# Patient Record
Sex: MALE | Race: WHITE | Employment: FULL TIME | URBAN - METROPOLITAN AREA
[De-identification: names, ages, dates, MRNs, and addresses within clinical notes are randomized per-mention and may not be internally consistent; named-entity substitution may affect disease eponyms.]

---

## 2017-12-04 ENCOUNTER — OFFICE VISIT (OUTPATIENT)
Dept: FAMILY MEDICINE CLINIC | Age: 32
End: 2017-12-04

## 2017-12-04 VITALS
SYSTOLIC BLOOD PRESSURE: 110 MMHG | OXYGEN SATURATION: 100 % | HEIGHT: 70 IN | WEIGHT: 186.8 LBS | TEMPERATURE: 98.2 F | BODY MASS INDEX: 26.74 KG/M2 | HEART RATE: 111 BPM | DIASTOLIC BLOOD PRESSURE: 80 MMHG

## 2017-12-04 DIAGNOSIS — Z23 NEED FOR TDAP VACCINATION: ICD-10-CM

## 2017-12-04 DIAGNOSIS — Z11.4 SCREENING FOR HIV (HUMAN IMMUNODEFICIENCY VIRUS): ICD-10-CM

## 2017-12-04 DIAGNOSIS — R31.21 ASYMPTOMATIC MICROSCOPIC HEMATURIA: ICD-10-CM

## 2017-12-04 DIAGNOSIS — H61.21 IMPACTED CERUMEN OF RIGHT EAR: ICD-10-CM

## 2017-12-04 DIAGNOSIS — F41.1 GENERALIZED ANXIETY DISORDER: ICD-10-CM

## 2017-12-04 DIAGNOSIS — Z00.00 ROUTINE GENERAL MEDICAL EXAMINATION AT A HEALTH CARE FACILITY: Primary | ICD-10-CM

## 2017-12-04 DIAGNOSIS — R82.90 ABNORMAL URINE FINDING: ICD-10-CM

## 2017-12-04 DIAGNOSIS — Z00.00 ROUTINE GENERAL MEDICAL EXAMINATION AT A HEALTH CARE FACILITY: ICD-10-CM

## 2017-12-04 LAB
A/G RATIO: 1.8 (ref 1.1–2.2)
ALBUMIN SERPL-MCNC: 4.9 G/DL (ref 3.4–5)
ALP BLD-CCNC: 89 U/L (ref 40–129)
ALT SERPL-CCNC: 45 U/L (ref 10–40)
ANION GAP SERPL CALCULATED.3IONS-SCNC: 16 MMOL/L (ref 3–16)
AST SERPL-CCNC: 21 U/L (ref 15–37)
BASOPHILS ABSOLUTE: 0 K/UL (ref 0–0.2)
BASOPHILS RELATIVE PERCENT: 0.4 %
BILIRUB SERPL-MCNC: 0.6 MG/DL (ref 0–1)
BILIRUBIN, POC: NORMAL
BLOOD URINE, POC: NORMAL
BUN BLDV-MCNC: 16 MG/DL (ref 7–20)
CALCIUM SERPL-MCNC: 9.9 MG/DL (ref 8.3–10.6)
CHLORIDE BLD-SCNC: 103 MMOL/L (ref 99–110)
CHOLESTEROL, TOTAL: 218 MG/DL (ref 0–199)
CLARITY, POC: CLEAR
CO2: 24 MMOL/L (ref 21–32)
COLOR, POC: YELLOW
CREAT SERPL-MCNC: 1 MG/DL (ref 0.9–1.3)
EOSINOPHILS ABSOLUTE: 0.2 K/UL (ref 0–0.6)
EOSINOPHILS RELATIVE PERCENT: 2.7 %
GFR AFRICAN AMERICAN: >60
GFR NON-AFRICAN AMERICAN: >60
GLOBULIN: 2.8 G/DL
GLUCOSE BLD-MCNC: 94 MG/DL (ref 70–99)
GLUCOSE URINE, POC: NORMAL
HCT VFR BLD CALC: 51.3 % (ref 40.5–52.5)
HDLC SERPL-MCNC: 48 MG/DL (ref 40–60)
HEMOGLOBIN: 17.6 G/DL (ref 13.5–17.5)
KETONES, POC: NORMAL
LDL CHOLESTEROL CALCULATED: 141 MG/DL
LEUKOCYTE EST, POC: NORMAL
LYMPHOCYTES ABSOLUTE: 1.5 K/UL (ref 1–5.1)
LYMPHOCYTES RELATIVE PERCENT: 21.4 %
MCH RBC QN AUTO: 30.9 PG (ref 26–34)
MCHC RBC AUTO-ENTMCNC: 34.2 G/DL (ref 31–36)
MCV RBC AUTO: 90.1 FL (ref 80–100)
MONOCYTES ABSOLUTE: 0.5 K/UL (ref 0–1.3)
MONOCYTES RELATIVE PERCENT: 6.4 %
NEUTROPHILS ABSOLUTE: 5 K/UL (ref 1.7–7.7)
NEUTROPHILS RELATIVE PERCENT: 69.1 %
NITRITE, POC: NORMAL
PDW BLD-RTO: 13.2 % (ref 12.4–15.4)
PH, POC: 5
PLATELET # BLD: 268 K/UL (ref 135–450)
PMV BLD AUTO: 9.1 FL (ref 5–10.5)
POTASSIUM SERPL-SCNC: 4.9 MMOL/L (ref 3.5–5.1)
PROTEIN, POC: NORMAL
RBC # BLD: 5.69 M/UL (ref 4.2–5.9)
SODIUM BLD-SCNC: 143 MMOL/L (ref 136–145)
SPECIFIC GRAVITY, POC: 1.03
TOTAL PROTEIN: 7.7 G/DL (ref 6.4–8.2)
TRIGL SERPL-MCNC: 145 MG/DL (ref 0–150)
TSH SERPL DL<=0.05 MIU/L-ACNC: 6.32 UIU/ML (ref 0.27–4.2)
UROBILINOGEN, POC: 0.2
VITAMIN D 25-HYDROXY: 15.9 NG/ML
VLDLC SERPL CALC-MCNC: 29 MG/DL
WBC # BLD: 7.2 K/UL (ref 4–11)

## 2017-12-04 PROCEDURE — 90715 TDAP VACCINE 7 YRS/> IM: CPT | Performed by: FAMILY MEDICINE

## 2017-12-04 PROCEDURE — 90471 IMMUNIZATION ADMIN: CPT | Performed by: FAMILY MEDICINE

## 2017-12-04 PROCEDURE — 81002 URINALYSIS NONAUTO W/O SCOPE: CPT | Performed by: FAMILY MEDICINE

## 2017-12-04 PROCEDURE — 99395 PREV VISIT EST AGE 18-39: CPT | Performed by: FAMILY MEDICINE

## 2017-12-04 RX ORDER — PAROXETINE 10 MG/1
10 TABLET, FILM COATED ORAL DAILY
Qty: 90 TABLET | Refills: 1 | Status: SHIPPED | OUTPATIENT
Start: 2017-12-04 | End: 2018-03-05 | Stop reason: SDUPTHER

## 2017-12-04 ASSESSMENT — ENCOUNTER SYMPTOMS
EYES NEGATIVE: 1
RESPIRATORY NEGATIVE: 1
GASTROINTESTINAL NEGATIVE: 1
ALLERGIC/IMMUNOLOGIC NEGATIVE: 1

## 2017-12-04 ASSESSMENT — PATIENT HEALTH QUESTIONNAIRE - PHQ9
SUM OF ALL RESPONSES TO PHQ QUESTIONS 1-9: 2
1. LITTLE INTEREST OR PLEASURE IN DOING THINGS: 1
2. FEELING DOWN, DEPRESSED OR HOPELESS: 1
SUM OF ALL RESPONSES TO PHQ9 QUESTIONS 1 & 2: 2

## 2017-12-04 NOTE — LETTER
[address]  Phone: [phone]  Fax: [phone]    Miles Rios MD        December 4, 2017    Cindy Hameed  [address] [place] [address].  [place] [phone]      To whom it may concern: The patient was seen by me today for a routine annual physical.     If you have any questions or concerns, please don't hesitate to call.     Sincerely,        Miles Rios MD

## 2017-12-04 NOTE — LETTER
[address] [address]  Phone: [phone]  Fax: [phone]    Miles Rios MD                                                                                   December 4, 2017                       Cindy Hameed  [address] [place] [address].  [place] [phone]      Dear Yue Godinez: Thank you for enrolling in [address] Please follow the instructions below to securely access your online medical record. [facility] allows you to send messages to your doctor, view your test results, renew your prescriptions, schedule appointments, and more. How Do I Sign Up? 1. In your Internet browser, go to [email or web address]  2. Click on the Sign Up Now link in the Sign In box. You will see the New Member Sign Up page. 3. Enter your [facility] Access Code exactly as it appears below. You will not need to use this code after youve completed the sign-up process. If you do not sign up before the expiration date, you must request a new code. [facility] Access Code: [email or web address]  Expires: 2/2/2018  7:48 AM    4. Enter your Social Security Number ([id number]) and Date of Birth (mm/dd/yyyy) as indicated and click Submit. You will be taken to the next sign-up page. 5. Create a [facility] ID. This will be your [facility] login ID and cannot be changed, so think of one that is secure and easy to remember. 6. Create a [facility] password. You can change your password at any time. 7. Enter your Password Reset Question and Answer. This can be used at a later time if you forget your password. 8. Enter your e-mail address. You will receive e-mail notification when new information is available in [address] 9. Click Sign Up. You can now view your medical record. Additional Information  If you have questions, please contact the physician practice where you receive care. Remember, [facility] is NOT to be used for urgent needs. For medical emergencies, dial 911. For questions regarding your [facility] account call [phone]. If you have a clinical question, please call your doctor's office.     Sincerely,  Nicki Chávez MD

## 2017-12-05 ENCOUNTER — TELEPHONE (OUTPATIENT)
Dept: PSYCHOLOGY | Age: 32
End: 2017-12-05

## 2017-12-05 LAB — HIV-1 AND HIV-2 ANTIBODIES: NORMAL

## 2017-12-06 DIAGNOSIS — R79.89 ABNORMAL THYROID BLOOD TEST: ICD-10-CM

## 2017-12-06 DIAGNOSIS — E55.9 VITAMIN D DEFICIENCY: Primary | ICD-10-CM

## 2017-12-06 LAB — URINE CULTURE, ROUTINE: NORMAL

## 2017-12-06 RX ORDER — ERGOCALCIFEROL (VITAMIN D2) 1250 MCG
50000 CAPSULE ORAL WEEKLY
Qty: 16 CAPSULE | Refills: 0 | Status: SHIPPED | OUTPATIENT
Start: 2017-12-06 | End: 2021-03-02 | Stop reason: CLARIF

## 2017-12-15 ENCOUNTER — OFFICE VISIT (OUTPATIENT)
Dept: PSYCHOLOGY | Age: 32
End: 2017-12-15

## 2017-12-15 DIAGNOSIS — F32.A DEPRESSIVE DISORDER: ICD-10-CM

## 2017-12-15 DIAGNOSIS — F40.10 SOCIAL ANXIETY DISORDER: Primary | ICD-10-CM

## 2017-12-15 PROCEDURE — 90791 PSYCH DIAGNOSTIC EVALUATION: CPT | Performed by: PSYCHOLOGIST

## 2017-12-15 ASSESSMENT — PATIENT HEALTH QUESTIONNAIRE - PHQ9
9. THOUGHTS THAT YOU WOULD BE BETTER OFF DEAD, OR OF HURTING YOURSELF: 0
SUM OF ALL RESPONSES TO PHQ QUESTIONS 1-9: 3
2. FEELING DOWN, DEPRESSED OR HOPELESS: 0
1. LITTLE INTEREST OR PLEASURE IN DOING THINGS: 2
SUM OF ALL RESPONSES TO PHQ9 QUESTIONS 1 & 2: 2
5. POOR APPETITE OR OVEREATING: 0
6. FEELING BAD ABOUT YOURSELF - OR THAT YOU ARE A FAILURE OR HAVE LET YOURSELF OR YOUR FAMILY DOWN: 0
7. TROUBLE CONCENTRATING ON THINGS, SUCH AS READING THE NEWSPAPER OR WATCHING TELEVISION: 0
4. FEELING TIRED OR HAVING LITTLE ENERGY: 1
8. MOVING OR SPEAKING SO SLOWLY THAT OTHER PEOPLE COULD HAVE NOTICED. OR THE OPPOSITE, BEING SO FIGETY OR RESTLESS THAT YOU HAVE BEEN MOVING AROUND A LOT MORE THAN USUAL: 0
3. TROUBLE FALLING OR STAYING ASLEEP: 0

## 2017-12-15 ASSESSMENT — ANXIETY QUESTIONNAIRES
1. FEELING NERVOUS, ANXIOUS, OR ON EDGE: 1-SEVERAL DAYS
5. BEING SO RESTLESS THAT IT IS HARD TO SIT STILL: 0-NOT AT ALL SURE
7. FEELING AFRAID AS IF SOMETHING AWFUL MIGHT HAPPEN: 0-NOT AT ALL SURE
2. NOT BEING ABLE TO STOP OR CONTROL WORRYING: 0-NOT AT ALL SURE
GAD7 TOTAL SCORE: 2
4. TROUBLE RELAXING: 0-NOT AT ALL SURE
6. BECOMING EASILY ANNOYED OR IRRITABLE: 1-SEVERAL DAYS
3. WORRYING TOO MUCH ABOUT DIFFERENT THINGS: 0-NOT AT ALL SURE

## 2017-12-15 NOTE — PROGRESS NOTES
no  Suicide Assessment: no suicidal ideation      History:    Medications:   Current Outpatient Prescriptions   Medication Sig Dispense Refill    ergocalciferol (ERGOCALCIFEROL) 57546 units capsule Take 1 capsule by mouth once a week Weekly for four months 16 capsule 0    PARoxetine (PAXIL) 10 MG tablet Take 1 tablet by mouth daily 90 tablet 1    dicyclomine (BENTYL) 10 MG capsule Take 1 capsule by mouth 4 times daily for 14 days 56 capsule 0     No current facility-administered medications for this visit. Social History:   Social History     Social History    Marital status: Single     Spouse name: N/A    Number of children: N/A    Years of education: N/A     Occupational History    Not on file. Social History Main Topics    Smoking status: Current Some Day Smoker     Types: Cigarettes     Last attempt to quit: 7/24/2015    Smokeless tobacco: Never Used      Comment: smokes when he ddrinks    Alcohol use 0.0 oz/week      Comment: social (once a month)    Drug use: No    Sexual activity: Not on file     Other Topics Concern    Not on file     Social History Narrative    No narrative on file       TOBACCO:   reports that he has been smoking Cigarettes. He has never used smokeless tobacco.  ETOH:   reports that he drinks alcohol. Family History:   Family History   Problem Relation Age of Onset    Heart Attack Maternal Grandfather        A:  Pt reported a history of anxiety related to social situations that has caused him to become more avoidant and less active over the past few years. Functions well at work but has increasingly been limiting his social interactions to minimize anxiety that causes somatic symptoms (e.g., GI upset). Some social support from his mother and close friend. Denied feeling depressed but did endorse worsening anhedonia. Pt started taking Paxil 10mg last week, prescribed by Dr. Kailey David. Normalized and validated pt's distress.  Provided psychoeducation about anxiety, the role of avoidance in maintaining anxiety, depression, CBT, and mindfulness. Practiced diaphragmatic breathing, progressive muscle relaxation, and grounding during visit. Encouraged pt to engage more socially. Pt denied SI/HI, plan or intent. No safety concerns at this time. Pt is aware of this writer's upcoming maternity leave. He declined a referral for psychotherapy in the community, stating he would prefer to return to see this writer upon my return in spring 2018. Scheduled F/U visit for that time. ALY 7 SCORE 12/15/2017   ALY-7 Total Score 2     Interpretation of ALY-7 score: 5-9 = mild anxiety, 10-14 = moderate anxiety, 15+ = severe anxiety. Recommend referral to behavioral health for scores 10 or greater. PHQ Scores 12/15/2017 12/4/2017   PHQ2 Score 2 2   PHQ9 Score 3 2     Interpretation of Total Score Depression Severity: 1-4 = Minimal depression, 5-9 = Mild depression, 10-14 = Moderate depression, 15-19 = Moderately severe depression, 20-27 = Severe depression    Diagnosis:    1. Social anxiety disorder    2. Depressive disorder        Patient Active Problem List   Diagnosis    Irritable bowel syndrome with diarrhea    Anxiety disorder    Vitamin D deficiency    Abnormal thyroid blood test         Plan:  Pt interventions:  Established rapport, [place]-setting to identify pt's primary goals for [facility] visit / overall health, Supportive techniques, Emphasized self-care as important for managing overall health, Provided Psychoeducation re: anxiety, the role of avoidance in maintaining anxiety, depression, CBT, mindfulness, CBT to target maladaptive thoughts, Identified relevant behavioral strategies for targeting symptom reduction including increased socialization, mindfulness strategies, Explained relaxed breathing technique in detail and practiced this with pt in visit and Emphasized importance of regular practice of relaxation strategies to target / promote stress mgmt.  Provided handouts on

## 2017-12-15 NOTE — PATIENT INSTRUCTIONS
Goals: 1. Start saying yes to invitations to do things with friends and family  2. Practice being mindful - paying attention to the present moment in a nonjudgmental way  3. Practice diaphragmatic breathing throughout the day  4. Practice progressive muscle relaxation for 5 minutes daily  5. Practice grounding exercises - noticing what your 5 senses are experiencing in the moment        Depression: Facts, Myths & Tips for Feeling Better    Facts  Depression is very common  Nearly 20% of the [place] population experiences a significant depression during their lifetime. Depression is treatable  Because depression affects so many, and can have such a powerful and negative impact on life, there has been an enormous amount of research conducted on how to reduce symptoms and improve functioning. As a result, we now know there are behaviors YOU can engage in to make yourself feel better. Depression is not a weakness  People suffer from depression for a variety of reasons, biological, environmental and behavioral.  Research indicates that [employer] is NOT one of the reasons people become depressed. Depression is not something you are powerless against  Evidence suggests that you can directly impact the intensity and duration of depression by what you do and by altering the way you think about certain things. The Downward Spiral  Depression often begins as a drop in mood due to an environmental or biological trigger that makes people feel less like being active. Being less active, in turn, often causes people to experience an even lower mood and feel even less like being active, and so the cycle begins. 1. Low mood begins and you feel like doing less. 2. You stop doing the things you enjoy and become less active. 3. Your low mood becomes even lower and you feel like doing even less.   4. Cycle continues until you begin to completely isolate and cease all pleasurable activities OR you reverse the this?  7. Am I using extreme, black-and-white language? What less extreme words might be more accurate? 8. Am I fortune telling or mind reading in a way that gets me upset or unhappy? What are the odds (percent chance -- e.g., there is a 5% chance. ..) that it will really turn out the way Im thinking or imagining? 9. What are my options in this situation? How would I like to respond? 10. Create more moderate, helpful, or realistic statements to replace the upsetting ones. 11. Have I had any experiences that show that this thought might not be totally true? 12. If my best friend or someone I loved had this thought, what would I tell them? 15. If my best friend or someone I loved knew I was thinking this thought, what would they say to me? What evidence would they point out to me that would suggest that my thought is not completely true? 14. Are there strengths in me or positives in the situation that I am ignoring? Am I underestimating my ability to cope with unfortunate circumstances? 15. When I am not feeling this way, do I think about this situation any differently? How?  16. Have I been in this type of situation before? What happened? What have I learned from prior experiences that could help me now? 17. Five years from now, if I look back on this situation, will I look at it any differently? Will I focus on any different part of my experience? 25. Am I blaming myself for something over which I do not have complete control?         Depression Cycle         Thoughts        -There is no point in doing anything        -I dont have the energy        -I dont feel like it        -I will probably fail        -I need to rest more        -Ill do it later               Depressive Symptoms     Behaviors  -Tired/Overwhelmed      -Stay in Bed  -Bored        -Avoid People  -Depressed       -Avoid Fun Activities  -Feeling Worthless      -Avoid Work  -Apathetic/Discouraged     -Guilty        Consequences of communication   [place] negative thinking about oneself or situations   [place] focusing on problems rather than solutions   Lack of meaning/purpose in life    It is important to note that there is rarely one single cause of depression. It is probable that if you are depressed, many of the causes described above are playing some role to some degree. Therefore, the more coping strategies you adopt over time to address the various causes of depression described above, the more successful you will be in reducing your depression. MINDFULNESS    Mindfulness means paying attention in a particular way: on purpose, in the present moment, and non-judgmentally. Sebastian Sat    \"Mindfulness is the basic human ability to be fully present, aware of where we are and what were doing, and not overly reactive or overwhelmed by whats going on around us. \"   -Mindful [place]    Paying attention on purpose  Mindfulness involves paying attention on purpose. Mindfulness involves a conscious direction of our awareness. This can mean purposefully directing our attention to the breath, or a particular emotion, or an activity as simple as eating. Doing so allows us to actively shape the mind. Paying attention in the present moment  Left to itself, the mind wanders through all kinds of thoughts  including thoughts expressing anger, craving, depression, self-pity, and anxiety. As we indulge in these kinds of thoughts, we reinforce those emotions and cause ourselves to suffer. These thoughts usually center around the past or future. But the past no longer exists. The future is just a fantasy until it happens. The one moment we actually can experience  the present moment  is the one we seem most to avoid.  By purposefully directing our awareness away from thoughts about the past or future and instead towards the [address] - our present moment experience - we decrease the effect of these thoughts on our lives. Paying attention non-judgmentally  Mindfulness is an emotionally non-reactive state. We don't  that this experience is good and that one is bad. Or, if we do make those judgments, we dont get upset in reaction to our experience. We simply notice it arising, observe it mindfully, and allow it to pass through us. When practicing mindfulness, we may be aware that certain experiences are pleasant and some are unpleasant, but on an emotional level we dont react. Resources  · \"Wherever You Go, There You Are: Mindfulness Meditation in Everyday Life\" - by Sagrario Arzola  · \"The Miracle of Mindfulness: An Introduction to the Practice of Meditation\" - by Cici Portillo  · \"The Power of Now: A Guide to Spiritual Enlightenment\" - by Charlie Herbert  · \"The Mindfulness Solution: Everyday Practices for Everyday Problems\" - by uJan Mckeon    Ways to Practice Mindfulness  · Pay attention to your breathing  · Take a mindful walk  · Eat mindfully  · Ground yourself in your five senses  · Journal  · Try dishwashing, cleaning and doing laundry a little bit slower than you usually do  · Meditate        Diaphragmatic Breathing    \"The entire autonomic nervous system (and through it, our internal organs and glands) is largely driven by our breathing patterns. By changing our breathing we can influence millions of biochemical reactions in our body, producing more relaxing substances such as endorphins and fewer anxiety-producing ones like adrenaline and higher blood acidity. Mindfulness of the breath is so effective that it is common to all meditative and prayer traditions. \" Anxiety Fear & Breathing - Breathing. com    \"When overcoming high levels of anxiety, it is important to learn the techniques of correct breathing.  Many people who live with high levels of anxiety are known to breathe through their chest. Shallow breathing through the chest means you are disrupting the balance of oxygen and carbon dioxide increasing arousal during stressful situations. What Is the Best Way To Use Diaphragmatic Breathing Exercises?  Use diaphragmatic breathing frequently.  Take deep breaths at the first signs of stress, anxiety, physical tension, or other symptoms.  Schedule time for relaxation. How to Do It  Diaphragmatic breathing involves taking smooth, slow, and regular breaths. Sitting upright can increase the capacity of your lungs to fill with air. It is best to 'take the weight' off your shoulders by supporting your arms on the side-arms of a chair, or on your lap. You may also choose to practice breathing while lying down as well. 1. Take a slow breath in through the nose, breathing into your lower belly (for about 4 seconds)   2. Exhale slowly through the mouth (for about 7-8 seconds)     About 6-8 breathing cycles per minute is often helpful to decrease anxiety, but find your  own comfortable breathing rhythm. These cycles regulate the amount of oxygen you  take in so that you do not experience the fainting, tingling, and giddy sensations that are  sometimes associated with overbreathing. Helpful Hints  Make sure that you arent hyperventitating; it is important to pause for a second or two after each breath. Try to breathe from your diaphragm or abdomen. Your shoulders and chest area  should be fairly relaxed and still. If this is challenging at first, it can be helpful to  first try this exercise by lying down on the floor with one hand on your heart, the  other hand on your abdomen. Watch the hand on your abdomen rise as you fill  your lungs with air, expanding your chest.     Rules of Practice   Try diaphragmatic breathing for one to two minutes throughout the day. You do not need to be feeling anxious to practice  in fact, at first you  should practice while feeling relatively calm.  You need to be comfortable  breathing this way when feeling calm before you can feel comfortable doing it  when anxious. [place] gradually master this skill and feel the benefits! Once you are comfortable with this technique, you will feel more comfortable using it in situations that cause anxiety. Progressive Muscle Relaxation (8 Muscle Groups)    For each muscle group, tense the muscles involved about 1/3 to 2/3 of the maximum tension possible (enough to feel tension but not any pain). Hold in the tensed position for about 4 seconds, then let the muscles relax in their natural resting positions for about 40 seconds. Do not tense them again. Allow them to relax as you move on to the next muscle group. If you prefer not to add more tension to your body, simply focus on these muscle groups, one at a time. Notice any tension, tightness or soreness in that area. Imagine sending your breath into those muscle to relax them, letting the tension go as you exhale. If a certain area of your body does not respond with relaxation, simply notice this and move on. You can choose to do a body scan by working your way up or down your body in whatever order you choose. You can also use the following guidelines:    1. Both Arms:  Turn your palms up, then make a fist.  Bring your fists up to your shoulders while tensing the biceps. 2. Both Legs:  Lift both legs off of the ground, straighten your knees, and point your toes toward your head. 3. Abdomen:  Tighten these muscles as if you were about to be hit in the stomach. 4. Chest:  Take a very deep breath (through your upper chest, not your diaphragm) and hold it. 5. Shoulders:  Lift both shoulders up toward your ears. 6. Back of Neck:  Tuck in and lower your chin toward your chest.    7. Eyes:  Squint. 8. Forehead:  Raise your eyebrows. Grounding  Grounding is a technique that helps keep you focused on the present moment by reorienting you to reality in the here-and-now.  Grounding skills can be helpful in managing overwhelming feelings or intense anxiety. They help you to regain your mental focus from an often intensely emotional state. Grounding skills occur within two specific approaches:   Sensory Awareness and Cognitive Awareness    1. Sensory Awareness (awareness of senses)    Grounding Exercise #1:   Keep your eyes open, look around the room, notice your surroundings, notice  details.  Hold a pillow, stuffed animal or a ball.  Place a cool cloth or hold something cool (e.g., can of soda) on your forehead or the inside of your wrist   Listen to soothing music   Put your feet firmly on the ground   FOCUS on someones voice or a neutral conversation. Grounding Exercise #2:   The [id number] Exercise   Name 5 things you can see in the room with you.  Name 4 things you can feel Ermelinda Rubin on my back or feet on floor)   Name 3 things you can hear right now (fingers tapping on keyboard or tv)   Name 2 things you can smell right now (or, 2 things you like the smell of)   Name 1 good thing about yourself    Grounding Exercise #3  5/5/5 Grounding Exercise  What are 5 things you can see? What are 5 things you can feel? What are 5 things you can hear?    -Repeat with 4 different observations for each, 3 different for each, 2 different for each, etc.   -If you get to 1 and are still feeling anxious, re-start at 5 with 5 different things you can see. 2. Cognitive Awareness (awareness of thoughts)    Grounding Exercise #4: Re-orient yourself in place and time by asking yourself some or all of these questions:  1. Where am I?  2. What is today? 3. What is the date? 4. What is the month? 5. What is the year? 6. How old am I?  7. What season is it?

## 2018-03-05 ENCOUNTER — OFFICE VISIT (OUTPATIENT)
Dept: FAMILY MEDICINE CLINIC | Age: 33
End: 2018-03-05

## 2018-03-05 VITALS
TEMPERATURE: 98.5 F | WEIGHT: 191.6 LBS | BODY MASS INDEX: 27.43 KG/M2 | HEIGHT: 70 IN | SYSTOLIC BLOOD PRESSURE: 110 MMHG | DIASTOLIC BLOOD PRESSURE: 88 MMHG

## 2018-03-05 DIAGNOSIS — R79.89 ABNORMAL THYROID BLOOD TEST: ICD-10-CM

## 2018-03-05 DIAGNOSIS — E55.9 VITAMIN D DEFICIENCY: ICD-10-CM

## 2018-03-05 DIAGNOSIS — K58.0 IRRITABLE BOWEL SYNDROME WITH DIARRHEA: ICD-10-CM

## 2018-03-05 DIAGNOSIS — E78.2 MIXED HYPERLIPIDEMIA: ICD-10-CM

## 2018-03-05 DIAGNOSIS — F41.1 GENERALIZED ANXIETY DISORDER: Primary | ICD-10-CM

## 2018-03-05 PROCEDURE — 99214 OFFICE O/P EST MOD 30 MIN: CPT | Performed by: FAMILY MEDICINE

## 2018-03-05 RX ORDER — PAROXETINE HYDROCHLORIDE 20 MG/1
20 TABLET, FILM COATED ORAL DAILY
Qty: 90 TABLET | Refills: 1 | Status: SHIPPED | OUTPATIENT
Start: 2018-03-05 | End: 2018-09-11 | Stop reason: SDUPTHER

## 2018-03-05 ASSESSMENT — ENCOUNTER SYMPTOMS
GASTROINTESTINAL NEGATIVE: 1
RESPIRATORY NEGATIVE: 1
EYES NEGATIVE: 1

## 2018-03-05 NOTE — PROGRESS NOTES
Musculoskeletal: Negative. Psychiatric/Behavioral: Negative. Physical Exam   Constitutional: He appears well-developed and well-nourished. No distress. Neck: Normal range of motion. Neck supple. Normal carotid pulses, no hepatojugular reflux and no JVD present. Carotid bruit is not present. No tracheal deviation present. No thyromegaly present. Cardiovascular: Normal rate, regular rhythm, S2 normal, normal heart sounds and intact distal pulses. PMI is not displaced. Exam reveals no gallop and no friction rub. No murmur heard. Pulses:       Carotid pulses are 2+ on the right side, and 2+ on the left side. Dorsalis pedis pulses are 2+ on the right side, and 2+ on the left side. Posterior tibial pulses are 2+ on the right side, and 2+ on the left side. Pulmonary/Chest: Effort normal and breath sounds normal. No stridor. No respiratory distress. He has no wheezes. He has no rales. He exhibits no tenderness. Abdominal: Soft. Bowel sounds are normal. He exhibits no distension and no mass. There is no tenderness. There is no rebound and no guarding. Musculoskeletal:        Right ankle: He exhibits no swelling. Left ankle: He exhibits no swelling. Lymphadenopathy:     He has no cervical adenopathy. Skin: He is not diaphoretic. Psychiatric: He has a normal mood and affect. His behavior is normal. Judgment and thought content normal.         1. Generalized anxiety disorder Condition stable continue the medications, treatments, will check labs as appropriate  Will increase meds  PARoxetine (PAXIL) 20 MG tablet   2. Irritable bowel syndrome with diarrhea  Condition stable continue the medications, treatments, will check labs as appropriate       3. Vitamin D deficiency  Condition stable continue the medications, treatments, will check labs as appropriate    Vitamin D 25 Hydroxy   4.  Abnormal thyroid blood test  Condition stable continue the medications, treatments, will

## 2018-09-11 ENCOUNTER — OFFICE VISIT (OUTPATIENT)
Dept: FAMILY MEDICINE CLINIC | Age: 33
End: 2018-09-11

## 2018-09-11 VITALS
WEIGHT: 195 LBS | HEART RATE: 76 BPM | SYSTOLIC BLOOD PRESSURE: 118 MMHG | BODY MASS INDEX: 27.92 KG/M2 | HEIGHT: 70 IN | TEMPERATURE: 97.9 F | RESPIRATION RATE: 16 BRPM | DIASTOLIC BLOOD PRESSURE: 78 MMHG | OXYGEN SATURATION: 98 %

## 2018-09-11 DIAGNOSIS — E55.9 VITAMIN D DEFICIENCY: ICD-10-CM

## 2018-09-11 DIAGNOSIS — R79.89 ABNORMAL THYROID BLOOD TEST: ICD-10-CM

## 2018-09-11 DIAGNOSIS — F41.1 GENERALIZED ANXIETY DISORDER: Primary | ICD-10-CM

## 2018-09-11 DIAGNOSIS — E66.3 OVERWEIGHT (BMI 25.0-29.9): ICD-10-CM

## 2018-09-11 DIAGNOSIS — Z72.0 TOBACCO ABUSE: ICD-10-CM

## 2018-09-11 DIAGNOSIS — K58.0 IRRITABLE BOWEL SYNDROME WITH DIARRHEA: ICD-10-CM

## 2018-09-11 LAB
TSH SERPL DL<=0.05 MIU/L-ACNC: 4.38 UIU/ML (ref 0.27–4.2)
VITAMIN D 25-HYDROXY: 20.1 NG/ML

## 2018-09-11 PROCEDURE — 99214 OFFICE O/P EST MOD 30 MIN: CPT | Performed by: FAMILY MEDICINE

## 2018-09-11 RX ORDER — PAROXETINE HYDROCHLORIDE 20 MG/1
20 TABLET, FILM COATED ORAL DAILY
Qty: 90 TABLET | Refills: 1 | Status: SHIPPED | OUTPATIENT
Start: 2018-09-11 | End: 2019-03-09 | Stop reason: SDUPTHER

## 2018-09-11 ASSESSMENT — ENCOUNTER SYMPTOMS
BLOATING: 0
HEMATEMESIS: 0
HEMATOCHEZIA: 0
JAUNDICE: 0
DIARRHEA: 1
ROS SKIN COMMENTS: SKIN TAGS ON NECK
VISUAL CHANGE: 0
VOMITING: 0
ABDOMINAL PAIN: 1
CHEST TIGHTNESS: 0
NAUSEA: 0
SHORTNESS OF BREATH: 0
WHEEZING: 0
COLOR CHANGE: 0

## 2018-09-11 NOTE — PROGRESS NOTES
Subjective:      Patient ID: Zeyad Estes is a 26 y.o. male. Here to become established    Mental Health Problem   The primary symptoms include somatic symptoms. The primary symptoms do not include dysphoric mood, delusions, hallucinations, bizarre behavior, disorganized speech or negative symptoms. Primary symptoms comment: IBS. The current episode started more than 1 month ago. This is a chronic problem. Somatic symptoms include abdominal pain. Somatic symptoms do not include fatigue, headaches or myalgias. The onset of the illness is precipitated by emotional stress. Additional symptoms of the illness include abdominal pain. Additional symptoms of the illness do not include anhedonia, insomnia, hypersomnia, appetite change, unexpected weight change, fatigue, psychomotor retardation, feelings of worthlessness, attention impairment, euphoric mood, increased goal-directed activity, flight of ideas, inflated self-esteem, decreased need for sleep, distractible, poor judgment, visual change, headaches or seizures. He does not admit to suicidal ideas. He does not have a plan to commit suicide. He does not contemplate harming himself. He has not already injured self. He does not contemplate injuring another person. He has not already  injured another person. GI Problem   The primary symptoms include abdominal pain and diarrhea. Primary symptoms do not include fever, weight loss, fatigue, nausea, vomiting, melena, hematemesis, jaundice, hematochezia, dysuria, myalgias, arthralgias or rash. Primary symptoms comment: IBS. The problem has not changed since onset. The illness does not include dysphagia or bloating. Significant associated medical issues include irritable bowel syndrome. Hyperlipidemia  Not on medical treatment    Vit D def   Not on supplements          Review of Systems   Constitutional: Negative for appetite change, fatigue, fever, unexpected weight change and weight loss.    Eyes: Negative for Mouth/Throat: Oropharynx is clear and moist. No oropharyngeal exudate. Eyes: Pupils are equal, round, and reactive to light. Conjunctivae are normal. No scleral icterus. Neck: Normal range of motion. Neck supple. No carotid bruits     Cardiovascular: Normal rate, regular rhythm, normal heart sounds and intact distal pulses. Exam reveals no friction rub. No murmur heard. No edema     Pulmonary/Chest: Effort normal and breath sounds normal. No respiratory distress. He has no wheezes. He has no rales. He exhibits no tenderness. Abdominal: Soft. Bowel sounds are normal. He exhibits no distension. There is no tenderness. There is no rebound. Lymphadenopathy:     He has no cervical adenopathy. Neurological: He is alert and oriented to person, place, and time. No cranial nerve deficit. Coordination normal.   Skin: Skin is warm. No rash noted. He is not diaphoretic. No erythema. Psychiatric: He has a normal mood and affect. His behavior is normal.   Nursing note and vitals reviewed. Assessment:       Diagnosis Orders   1. Generalized anxiety disorder  PARoxetine (PAXIL) 20 MG tablet   2. Irritable bowel syndrome with diarrhea     3. Abnormal thyroid blood test  TSH without Reflex   4. Vitamin D deficiency  Vitamin D 25 Hydroxy   5. Tobacco abuse     6. Overweight (BMI 25.0-29. 9)             Plan:      1. Stable  Continue same medications no changes needed at this time   Life style changes discussed  Fu in 2 mo    2. Sx improved with ssri  Continue to monitor    3. Check fu tsh    4. Not on tx  Check fu vit D level    5. Not ready to quit  Smoking cessation was encouraged. Cessation techniques reviewed today. 6.  Counseling: encourage to adjust caloric and fat  intake to maintain or achieve ideal body weight, to emphasize fruits, vegetables, whole grains, if possible drink  vegetable milks, reduce meats, poultry, fish, and rich in legume like beans,lentus, chickpeas ,  nuts.  Exercise is a

## 2018-11-12 ENCOUNTER — OFFICE VISIT (OUTPATIENT)
Dept: FAMILY MEDICINE CLINIC | Age: 33
End: 2018-11-12
Payer: COMMERCIAL

## 2018-11-12 VITALS
SYSTOLIC BLOOD PRESSURE: 118 MMHG | OXYGEN SATURATION: 99 % | HEART RATE: 72 BPM | BODY MASS INDEX: 28.2 KG/M2 | HEIGHT: 70 IN | DIASTOLIC BLOOD PRESSURE: 82 MMHG | RESPIRATION RATE: 16 BRPM | TEMPERATURE: 97.6 F | WEIGHT: 197 LBS

## 2018-11-12 DIAGNOSIS — E55.9 VITAMIN D DEFICIENCY: ICD-10-CM

## 2018-11-12 DIAGNOSIS — Z00.00 WELL ADULT EXAM: Primary | ICD-10-CM

## 2018-11-12 DIAGNOSIS — A63.0 CONDYLOMA ACUMINATUM OF PENIS: ICD-10-CM

## 2018-11-12 DIAGNOSIS — Z11.3 SCREEN FOR STD (SEXUALLY TRANSMITTED DISEASE): ICD-10-CM

## 2018-11-12 DIAGNOSIS — F41.1 GENERALIZED ANXIETY DISORDER: ICD-10-CM

## 2018-11-12 LAB
A/G RATIO: 1.9 (ref 1.1–2.2)
ALBUMIN SERPL-MCNC: 5 G/DL (ref 3.4–5)
ALP BLD-CCNC: 88 U/L (ref 40–129)
ALT SERPL-CCNC: 50 U/L (ref 10–40)
ANION GAP SERPL CALCULATED.3IONS-SCNC: 17 MMOL/L (ref 3–16)
AST SERPL-CCNC: 25 U/L (ref 15–37)
BASOPHILS ABSOLUTE: 0 K/UL (ref 0–0.2)
BASOPHILS RELATIVE PERCENT: 0.6 %
BILIRUB SERPL-MCNC: 1 MG/DL (ref 0–1)
BUN BLDV-MCNC: 15 MG/DL (ref 7–20)
CALCIUM SERPL-MCNC: 10 MG/DL (ref 8.3–10.6)
CHLORIDE BLD-SCNC: 104 MMOL/L (ref 99–110)
CHOLESTEROL, TOTAL: 222 MG/DL (ref 0–199)
CO2: 20 MMOL/L (ref 21–32)
CREAT SERPL-MCNC: 0.9 MG/DL (ref 0.9–1.3)
EOSINOPHILS ABSOLUTE: 0.2 K/UL (ref 0–0.6)
EOSINOPHILS RELATIVE PERCENT: 2.8 %
GFR AFRICAN AMERICAN: >60
GFR NON-AFRICAN AMERICAN: >60
GLOBULIN: 2.6 G/DL
GLUCOSE BLD-MCNC: 88 MG/DL (ref 70–99)
HCT VFR BLD CALC: 45.8 % (ref 40.5–52.5)
HDLC SERPL-MCNC: 47 MG/DL (ref 40–60)
HEMOGLOBIN: 15.8 G/DL (ref 13.5–17.5)
HEPATITIS C ANTIBODY INTERPRETATION: NORMAL
LDL CHOLESTEROL CALCULATED: 152 MG/DL
LYMPHOCYTES ABSOLUTE: 1.9 K/UL (ref 1–5.1)
LYMPHOCYTES RELATIVE PERCENT: 27.4 %
MCH RBC QN AUTO: 30.8 PG (ref 26–34)
MCHC RBC AUTO-ENTMCNC: 34.5 G/DL (ref 31–36)
MCV RBC AUTO: 89.2 FL (ref 80–100)
MONOCYTES ABSOLUTE: 0.4 K/UL (ref 0–1.3)
MONOCYTES RELATIVE PERCENT: 6.2 %
NEUTROPHILS ABSOLUTE: 4.4 K/UL (ref 1.7–7.7)
NEUTROPHILS RELATIVE PERCENT: 63 %
PDW BLD-RTO: 12.9 % (ref 12.4–15.4)
PLATELET # BLD: 287 K/UL (ref 135–450)
PMV BLD AUTO: 8.8 FL (ref 5–10.5)
POTASSIUM SERPL-SCNC: 4.3 MMOL/L (ref 3.5–5.1)
RBC # BLD: 5.13 M/UL (ref 4.2–5.9)
SODIUM BLD-SCNC: 141 MMOL/L (ref 136–145)
TOTAL PROTEIN: 7.6 G/DL (ref 6.4–8.2)
TRIGL SERPL-MCNC: 113 MG/DL (ref 0–150)
TSH SERPL DL<=0.05 MIU/L-ACNC: 5.04 UIU/ML (ref 0.27–4.2)
VITAMIN D 25-HYDROXY: 28.6 NG/ML
VLDLC SERPL CALC-MCNC: 23 MG/DL
WBC # BLD: 7 K/UL (ref 4–11)

## 2018-11-12 PROCEDURE — 99213 OFFICE O/P EST LOW 20 MIN: CPT | Performed by: FAMILY MEDICINE

## 2018-11-12 PROCEDURE — 99395 PREV VISIT EST AGE 18-39: CPT | Performed by: FAMILY MEDICINE

## 2018-11-12 RX ORDER — PODOFILOX 5 MG/ML
SOLUTION TOPICAL
Qty: 1 BOTTLE | Refills: 0 | Status: SHIPPED | OUTPATIENT
Start: 2018-11-12 | End: 2018-11-22

## 2018-11-12 ASSESSMENT — ENCOUNTER SYMPTOMS
TROUBLE SWALLOWING: 0
CHEST TIGHTNESS: 0
VOMITING: 0
ABDOMINAL PAIN: 0
EYE ITCHING: 0
SHORTNESS OF BREATH: 0
WHEEZING: 0
NAUSEA: 0
EYE REDNESS: 0
RHINORRHEA: 0

## 2018-11-12 NOTE — PROGRESS NOTES
Subjective:      Patient ID: Mira Gregg is a 26 y.o. male. HPI  Well Adult Physical   Patient here for a comprehensive physical exam.The patient reports problems -   1. Quit smoking 2 weeks ago, using e-cig   2. Rash on penis no painful   Onset 2 months ago,   unchanged, no dysuria, nor hematuria, no penile dc,   No fever or chills  One new sexual partner in the summer,   Worse w : nothing  Better w: nothing, has not tried topical tx  Hx of previous sx similar to this: no    3. Anxiety  Sx are well controlled with current med (paxil)  No secondary effects  Depression screening PHQ2 negative    Do you take any herbs or supplements that were not prescribed by a doctor? yes Are you taking calcium supplements? no Are you taking aspirin daily? not applicable   History:  Any STD's in the past? None  Tdap 2017  Exercise: no  Eye exam: due   Dentist: every 5 months     Review of Systems   Constitutional: Negative for activity change, appetite change, fatigue, fever and unexpected weight change. HENT: Negative for congestion, postnasal drip, rhinorrhea and trouble swallowing. Eyes: Negative for redness and itching. Respiratory: Negative for chest tightness, shortness of breath and wheezing. Cardiovascular: Negative for chest pain and palpitations. Gastrointestinal: Negative for abdominal pain, nausea and vomiting. Genitourinary: Negative for discharge, dysuria, testicular pain and urgency. Musculoskeletal: Negative for arthralgias, joint swelling, myalgias and neck pain. Skin: Positive for rash. Neurological: Negative for light-headedness and headaches. Hematological: Negative for adenopathy. Psychiatric/Behavioral: Negative for sleep disturbance. The patient is not nervous/anxious. has No Known Allergies.       Current Outpatient Prescriptions:     PARoxetine (PAXIL) 20 MG tablet, Take 1 tablet by mouth daily, Disp: 90 tablet, Rfl: 1    ergocalciferol (ERGOCALCIFEROL) 25415 units

## 2018-11-13 LAB
HIV AG/AB: NORMAL
HIV ANTIGEN: NORMAL
HIV-1 ANTIBODY: NORMAL
HIV-2 AB: NORMAL
TOTAL SYPHILLIS IGG/IGM: NORMAL

## 2019-03-09 DIAGNOSIS — F41.1 GENERALIZED ANXIETY DISORDER: ICD-10-CM

## 2019-03-11 RX ORDER — PAROXETINE HYDROCHLORIDE 20 MG/1
20 TABLET, FILM COATED ORAL DAILY
Qty: 90 TABLET | Refills: 1 | Status: SHIPPED | OUTPATIENT
Start: 2019-03-11 | End: 2019-06-05 | Stop reason: SDUPTHER

## 2019-06-05 DIAGNOSIS — F41.1 GENERALIZED ANXIETY DISORDER: ICD-10-CM

## 2019-06-05 RX ORDER — PAROXETINE HYDROCHLORIDE 20 MG/1
20 TABLET, FILM COATED ORAL DAILY
Qty: 90 TABLET | Refills: 1 | Status: SHIPPED | OUTPATIENT
Start: 2019-06-05 | End: 2019-09-04 | Stop reason: SDUPTHER

## 2019-09-04 DIAGNOSIS — F41.1 GENERALIZED ANXIETY DISORDER: ICD-10-CM

## 2019-09-04 RX ORDER — PAROXETINE HYDROCHLORIDE 20 MG/1
20 TABLET, FILM COATED ORAL DAILY
Qty: 90 TABLET | Refills: 0 | Status: SHIPPED | OUTPATIENT
Start: 2019-09-04 | End: 2021-03-02 | Stop reason: CLARIF

## 2019-10-18 ENCOUNTER — OFFICE VISIT (OUTPATIENT)
Dept: FAMILY MEDICINE CLINIC | Age: 34
End: 2019-10-18
Payer: COMMERCIAL

## 2019-10-18 VITALS
SYSTOLIC BLOOD PRESSURE: 114 MMHG | RESPIRATION RATE: 16 BRPM | DIASTOLIC BLOOD PRESSURE: 66 MMHG | HEART RATE: 62 BPM | HEIGHT: 70 IN | TEMPERATURE: 97.6 F | OXYGEN SATURATION: 98 % | WEIGHT: 202 LBS | BODY MASS INDEX: 28.92 KG/M2

## 2019-10-18 DIAGNOSIS — Z00.00 WELL ADULT EXAM: Primary | ICD-10-CM

## 2019-10-18 DIAGNOSIS — Z23 FLU VACCINE NEED: ICD-10-CM

## 2019-10-18 DIAGNOSIS — N48.89 PEARLY PENILE PAPULES: ICD-10-CM

## 2019-10-18 DIAGNOSIS — F41.8 DEPRESSION WITH ANXIETY: ICD-10-CM

## 2019-10-18 DIAGNOSIS — A63.0 GENITAL WARTS: ICD-10-CM

## 2019-10-18 LAB
ANION GAP SERPL CALCULATED.3IONS-SCNC: 16 MMOL/L (ref 3–16)
BUN BLDV-MCNC: 12 MG/DL (ref 7–20)
CALCIUM SERPL-MCNC: 9.7 MG/DL (ref 8.3–10.6)
CHLORIDE BLD-SCNC: 104 MMOL/L (ref 99–110)
CHOLESTEROL, TOTAL: 197 MG/DL (ref 0–199)
CO2: 23 MMOL/L (ref 21–32)
CREAT SERPL-MCNC: 0.9 MG/DL (ref 0.9–1.3)
GFR AFRICAN AMERICAN: >60
GFR NON-AFRICAN AMERICAN: >60
GLUCOSE BLD-MCNC: 85 MG/DL (ref 70–99)
HDLC SERPL-MCNC: 45 MG/DL (ref 40–60)
LDL CHOLESTEROL CALCULATED: 127 MG/DL
POTASSIUM SERPL-SCNC: 4.3 MMOL/L (ref 3.5–5.1)
SODIUM BLD-SCNC: 143 MMOL/L (ref 136–145)
TRIGL SERPL-MCNC: 127 MG/DL (ref 0–150)
VLDLC SERPL CALC-MCNC: 25 MG/DL

## 2019-10-18 PROCEDURE — 90686 IIV4 VACC NO PRSV 0.5 ML IM: CPT | Performed by: FAMILY MEDICINE

## 2019-10-18 PROCEDURE — 90471 IMMUNIZATION ADMIN: CPT | Performed by: FAMILY MEDICINE

## 2019-10-18 PROCEDURE — 36415 COLL VENOUS BLD VENIPUNCTURE: CPT | Performed by: FAMILY MEDICINE

## 2019-10-18 PROCEDURE — 99395 PREV VISIT EST AGE 18-39: CPT | Performed by: FAMILY MEDICINE

## 2019-10-18 RX ORDER — PAROXETINE 10 MG/1
10 TABLET, FILM COATED ORAL DAILY
Qty: 60 TABLET | Refills: 0 | Status: SHIPPED | OUTPATIENT
Start: 2019-10-18 | End: 2021-03-02 | Stop reason: CLARIF

## 2019-10-18 ASSESSMENT — ENCOUNTER SYMPTOMS
ABDOMINAL PAIN: 0
EYE ITCHING: 0
TROUBLE SWALLOWING: 0
NAUSEA: 0
CHEST TIGHTNESS: 0
EYE REDNESS: 0
WHEEZING: 0
VOMITING: 0
RHINORRHEA: 0
SHORTNESS OF BREATH: 0

## 2019-10-18 ASSESSMENT — PATIENT HEALTH QUESTIONNAIRE - PHQ9
SUM OF ALL RESPONSES TO PHQ9 QUESTIONS 1 & 2: 1
SUM OF ALL RESPONSES TO PHQ QUESTIONS 1-9: 1
2. FEELING DOWN, DEPRESSED OR HOPELESS: 0
1. LITTLE INTEREST OR PLEASURE IN DOING THINGS: 1
SUM OF ALL RESPONSES TO PHQ QUESTIONS 1-9: 1

## 2021-03-01 ENCOUNTER — TELEPHONE (OUTPATIENT)
Dept: FAMILY MEDICINE CLINIC | Age: 36
End: 2021-03-01

## 2021-03-01 NOTE — TELEPHONE ENCOUNTER
Db tomorrow at 730, arrive at 715   Only rash will be discussed because I'm double booking him please

## 2021-03-01 NOTE — TELEPHONE ENCOUNTER
----- Message from Nancy Yan sent at 3/1/2021  9:15 AM EST -----  Subject: Appointment Request    Reason for Call: Urgent Skin Problem    QUESTIONS  Type of Appointment? Established Patient  Reason for appointment request? No appointments available during search  Additional Information for Provider? Has a rash above elbow he needs   looked at   itching bumps.   ---------------------------------------------------------------------------  --------------  CALL BACK INFO  What is the best way for the office to contact you? OK to leave message on   voicemail  Preferred Call Back Phone Number? [phone]  ---------------------------------------------------------------------------  --------------  SCRIPT ANSWERS  Relationship to Patient? Self  Appointment reason? Symptomatic  Select script based on patient symptoms? Adult Skin Problems [Rash   Hives   Blisters   Lumps   Bumps   Sores   Bite]   Are you having swelling in your throat or face? No  Are you having difficulty breathing? No  Have the symptoms worsened or spread in the last day? No  Are you having pain with your symptoms? No  Are you having fevers (100.4)   chills or sweats? No  Have you previously seen a provider for this? No  Have you been diagnosed with   tested for   or told that you are suspected of having COVID-19 (Coronavirus)? No  Have you had a fever or taken medication to treat a fever within the past   3 days? No  Have you had a cough   shortness of breath or flu-like symptoms within the past 3 days? No  Do you currently have flu-like symptoms including fever or chills   cough   shortness of breath   or difficulty breathing   or new loss of taste or smell? No  (Service Expert  click yes below to proceed with Upplication As Usual   Scheduling)?  Yes

## 2021-03-02 ENCOUNTER — OFFICE VISIT (OUTPATIENT)
Dept: FAMILY MEDICINE CLINIC | Age: 36
End: 2021-03-02
Payer: COMMERCIAL

## 2021-03-02 VITALS
WEIGHT: 190 LBS | HEART RATE: 93 BPM | SYSTOLIC BLOOD PRESSURE: 124 MMHG | TEMPERATURE: 97.3 F | DIASTOLIC BLOOD PRESSURE: 80 MMHG | OXYGEN SATURATION: 98 % | BODY MASS INDEX: 27.2 KG/M2 | HEIGHT: 70 IN | RESPIRATION RATE: 16 BRPM

## 2021-03-02 DIAGNOSIS — R21 RASH: Primary | ICD-10-CM

## 2021-03-02 PROCEDURE — 99213 OFFICE O/P EST LOW 20 MIN: CPT | Performed by: FAMILY MEDICINE

## 2021-03-02 RX ORDER — METHYLPREDNISOLONE 4 MG/1
TABLET ORAL
Qty: 1 KIT | Refills: 0 | Status: SHIPPED | OUTPATIENT
Start: 2021-03-02 | End: 2021-03-08

## 2021-03-02 ASSESSMENT — ENCOUNTER SYMPTOMS
COUGH: 0
DIARRHEA: 0
SORE THROAT: 0
VOMITING: 0
SHORTNESS OF BREATH: 0
RHINORRHEA: 0
EYE PAIN: 0

## 2021-03-02 ASSESSMENT — PATIENT HEALTH QUESTIONNAIRE - PHQ9: SUM OF ALL RESPONSES TO PHQ QUESTIONS 1-9: 0

## 2021-03-02 NOTE — PROGRESS NOTES
Subjective:      Patient ID: Suzi Wise is a 38 y.o. male. Rash  This is a new problem. The current episode started in the past 7 days. The problem is unchanged. Location: arms, chest. The rash is characterized by redness and scaling. He was exposed to nothing. Pertinent negatives include no anorexia, congestion, cough, diarrhea, eye pain, facial edema, fatigue, fever, joint pain, rhinorrhea, shortness of breath, sore throat or vomiting. Past treatments include nothing. There is no history of eczema or varicella. Review of Systems   Constitutional: Negative for fatigue and fever. HENT: Negative for congestion, rhinorrhea and sore throat. Eyes: Negative for pain. Respiratory: Negative for cough and shortness of breath. Gastrointestinal: Negative for anorexia, diarrhea and vomiting. Musculoskeletal: Negative for joint pain. Skin: Positive for rash. has No Known Allergies. Current Outpatient Medications:     methylPREDNISolone (MEDROL DOSEPACK) 4 MG tablet, Take by mouth., Disp: 1 kit, Rfl: 0     has a past medical history of Anxiety, Anxiety disorder, Chicken pox, and Irritable bowel syndrome with diarrhea. Past Surgical History:   Procedure Laterality Date    WISDOM TOOTH EXTRACTION          reports that he has been smoking cigarettes. He has never used smokeless tobacco. He reports current alcohol use. He reports that he does not use drugs. family history includes Heart Attack in his maternal grandfather. Objective:  Blood pressure 124/80, pulse 93, temperature 97.3 °F (36.3 °C), temperature source Tympanic, resp. rate 16, height 5' 10\" (1.778 m), weight 190 lb (86.2 kg), SpO2 98 %. Physical Exam  Vitals signs and nursing note reviewed. Constitutional:       General: He is not in acute distress. Appearance: Normal appearance. He is not ill-appearing, toxic-appearing or diaphoretic. HENT:      Head: Normocephalic and atraumatic.    Neck: Musculoskeletal: Normal range of motion. Pulmonary:      Effort: No respiratory distress. Skin:     Comments: Scattered red scaly papules on arms , few on  chest,    Neurological:      General: No focal deficit present. Mental Status: He is alert and oriented to person, place, and time. Mental status is at baseline. Psychiatric:         Mood and Affect: Mood normal.         Behavior: Behavior normal.         Assessment:       Diagnosis Orders   1. Rash  methylPREDNISolone (MEDROL DOSEPACK) 4 MG tablet           Plan:      Possible environmental   Skin care   Medrol elissa   Patient to call if symptoms persist or worsen.      Fu 2 weeks prn          Precious Arreola MD

## 2022-01-13 ENCOUNTER — OFFICE VISIT (OUTPATIENT)
Dept: FAMILY MEDICINE CLINIC | Age: 37
End: 2022-01-13
Payer: COMMERCIAL

## 2022-01-13 VITALS
WEIGHT: 188.2 LBS | TEMPERATURE: 97.4 F | SYSTOLIC BLOOD PRESSURE: 118 MMHG | RESPIRATION RATE: 16 BRPM | OXYGEN SATURATION: 98 % | BODY MASS INDEX: 26.94 KG/M2 | HEIGHT: 70 IN | DIASTOLIC BLOOD PRESSURE: 78 MMHG | HEART RATE: 70 BPM

## 2022-01-13 DIAGNOSIS — B02.9 HERPES ZOSTER WITHOUT COMPLICATION: Primary | ICD-10-CM

## 2022-01-13 PROCEDURE — 99213 OFFICE O/P EST LOW 20 MIN: CPT | Performed by: FAMILY MEDICINE

## 2022-01-13 RX ORDER — VALACYCLOVIR HYDROCHLORIDE 1 G/1
1000 TABLET, FILM COATED ORAL 2 TIMES DAILY
Qty: 20 TABLET | Refills: 0 | Status: SHIPPED | OUTPATIENT
Start: 2022-01-13 | End: 2022-01-13 | Stop reason: DRUGHIGH

## 2022-01-13 RX ORDER — VALACYCLOVIR HYDROCHLORIDE 1 G/1
1000 TABLET, FILM COATED ORAL 3 TIMES DAILY
Qty: 21 TABLET | Refills: 0 | Status: SHIPPED | OUTPATIENT
Start: 2022-01-13 | End: 2022-01-20

## 2022-01-13 RX ORDER — METHYLPREDNISOLONE 4 MG/1
TABLET ORAL
Qty: 1 KIT | Refills: 0 | Status: SHIPPED | OUTPATIENT
Start: 2022-01-13 | End: 2022-01-19

## 2022-01-13 ASSESSMENT — PATIENT HEALTH QUESTIONNAIRE - PHQ9
SUM OF ALL RESPONSES TO PHQ9 QUESTIONS 1 & 2: 0
SUM OF ALL RESPONSES TO PHQ QUESTIONS 1-9: 0
SUM OF ALL RESPONSES TO PHQ QUESTIONS 1-9: 0
2. FEELING DOWN, DEPRESSED OR HOPELESS: 0
SUM OF ALL RESPONSES TO PHQ QUESTIONS 1-9: 0
1. LITTLE INTEREST OR PLEASURE IN DOING THINGS: 0
SUM OF ALL RESPONSES TO PHQ QUESTIONS 1-9: 0

## 2022-01-13 ASSESSMENT — ENCOUNTER SYMPTOMS
DIARRHEA: 0
EYE PAIN: 0
SORE THROAT: 0
RHINORRHEA: 0
VOMITING: 0
COUGH: 0
SHORTNESS OF BREATH: 0

## 2022-01-13 NOTE — PROGRESS NOTES
Subjective:      Patient ID: Jong Morataya is a 43 y.o. male. Rash  This is a new problem. The current episode started in the past 7 days. The problem is unchanged. Location: R back and abd  The rash is characterized by blistering, pain, redness and swelling. He was exposed to nothing. Pertinent negatives include no anorexia, congestion, cough, diarrhea, eye pain, facial edema, fatigue, fever, rhinorrhea, shortness of breath, sore throat or vomiting. Treatments tried: ubu. The treatment provided mild relief. His past medical history is significant for varicella. Review of Systems   Constitutional: Negative for fatigue and fever. HENT: Negative for congestion, rhinorrhea and sore throat. Eyes: Negative for pain. Respiratory: Negative for cough and shortness of breath. Gastrointestinal: Negative for anorexia, diarrhea and vomiting. Skin: Positive for rash. Objective:  Blood pressure 118/78, pulse 70, temperature 97.4 °F (36.3 °C), temperature source Tympanic, resp. rate 16, height 5' 10\" (1.778 m), weight 188 lb 3.2 oz (85.4 kg), SpO2 98 %. Physical Exam  Constitutional:       General: He is not in acute distress. Appearance: Normal appearance. He is not ill-appearing, toxic-appearing or diaphoretic. HENT:      Head: Normocephalic and atraumatic. Pulmonary:      Effort: No respiratory distress. Musculoskeletal:      Cervical back: Normal range of motion. Skin:            Comments: Tiny blisters on an erythematous base    Neurological:      General: No focal deficit present. Mental Status: He is alert and oriented to person, place, and time. Mental status is at baseline. Psychiatric:         Mood and Affect: Mood normal.         Behavior: Behavior normal.         Assessment:       Diagnosis Orders   1.  Herpes zoster without complication  valACYclovir (VALTREX) 1 g tablet    methylPREDNISolone (MEDROL DOSEPACK) 4 MG tablet           Plan:      Prescription for Valtrex and Medrol pack  Continue with ibuprofen alternated with Tylenol as needed for pain  Follow-up in 2 weeks if needed            Ana Diaz MD

## 2023-01-30 ENCOUNTER — OFFICE VISIT (OUTPATIENT)
Dept: FAMILY MEDICINE CLINIC | Age: 38
End: 2023-01-30
Payer: COMMERCIAL

## 2023-01-30 VITALS
DIASTOLIC BLOOD PRESSURE: 72 MMHG | SYSTOLIC BLOOD PRESSURE: 110 MMHG | TEMPERATURE: 97.7 F | HEIGHT: 70 IN | BODY MASS INDEX: 26.66 KG/M2 | HEART RATE: 88 BPM | OXYGEN SATURATION: 98 % | RESPIRATION RATE: 16 BRPM | WEIGHT: 186.2 LBS

## 2023-01-30 DIAGNOSIS — Z00.00 WELL ADULT EXAM: ICD-10-CM

## 2023-01-30 DIAGNOSIS — Z87.891 PERSONAL HISTORY OF TOBACCO USE, PRESENTING HAZARDS TO HEALTH: ICD-10-CM

## 2023-01-30 DIAGNOSIS — Z00.00 ENCOUNTER FOR WELL ADULT EXAM WITHOUT ABNORMAL FINDINGS: Primary | ICD-10-CM

## 2023-01-30 PROCEDURE — 99406 BEHAV CHNG SMOKING 3-10 MIN: CPT | Performed by: FAMILY MEDICINE

## 2023-01-30 PROCEDURE — 99395 PREV VISIT EST AGE 18-39: CPT | Performed by: FAMILY MEDICINE

## 2023-01-30 PROCEDURE — 90677 PCV20 VACCINE IM: CPT | Performed by: FAMILY MEDICINE

## 2023-01-30 PROCEDURE — 90471 IMMUNIZATION ADMIN: CPT | Performed by: FAMILY MEDICINE

## 2023-01-30 SDOH — ECONOMIC STABILITY: FOOD INSECURITY: WITHIN THE PAST 12 MONTHS, THE FOOD YOU BOUGHT JUST DIDN'T LAST AND YOU DIDN'T HAVE MONEY TO GET MORE.: NEVER TRUE

## 2023-01-30 SDOH — ECONOMIC STABILITY: FOOD INSECURITY: WITHIN THE PAST 12 MONTHS, YOU WORRIED THAT YOUR FOOD WOULD RUN OUT BEFORE YOU GOT MONEY TO BUY MORE.: NEVER TRUE

## 2023-01-30 ASSESSMENT — ENCOUNTER SYMPTOMS
SHORTNESS OF BREATH: 0
WHEEZING: 0
EYE ITCHING: 0
CHEST TIGHTNESS: 0
VOMITING: 0
ABDOMINAL PAIN: 0
NAUSEA: 0
EYE REDNESS: 0
RHINORRHEA: 0
TROUBLE SWALLOWING: 0

## 2023-01-30 ASSESSMENT — PATIENT HEALTH QUESTIONNAIRE - PHQ9
SUM OF ALL RESPONSES TO PHQ QUESTIONS 1-9: 0
SUM OF ALL RESPONSES TO PHQ QUESTIONS 1-9: 0
2. FEELING DOWN, DEPRESSED OR HOPELESS: 0
SUM OF ALL RESPONSES TO PHQ9 QUESTIONS 1 & 2: 0
SUM OF ALL RESPONSES TO PHQ QUESTIONS 1-9: 0
SUM OF ALL RESPONSES TO PHQ QUESTIONS 1-9: 0
1. LITTLE INTEREST OR PLEASURE IN DOING THINGS: 0

## 2023-01-30 ASSESSMENT — SOCIAL DETERMINANTS OF HEALTH (SDOH): HOW HARD IS IT FOR YOU TO PAY FOR THE VERY BASICS LIKE FOOD, HOUSING, MEDICAL CARE, AND HEATING?: NOT HARD AT ALL

## 2023-01-30 NOTE — PATIENT INSTRUCTIONS
Well Visit, Ages 25 to 72: Care Instructions  Well visits can help you stay healthy. Your doctor has checked your overall health and may have suggested ways to take good care of yourself. Your doctor also may have recommended tests. You can help prevent illness with healthy eating, good sleep, vaccinations, regular exercise, and other steps. Get the tests that you and your doctor decide on. Depending on your age and risks, examples might include screening for diabetes; hepatitis C; HIV; and cervical, breast, lung, and colon cancer. Screening helps find diseases before any symptoms appear. Eat healthy foods. Choose fruits, vegetables, whole grains, lean protein, and low-fat dairy foods. Limit saturated fat and reduce salt. Limit alcohol. Men should have no more than 2 drinks a day. Women should have no more than 1. For some people, no alcohol is the best choice. Exercise. Get at least 30 minutes of exercise on most days of the week. Walking can be a good choice. Reach and stay at your healthy weight. This will lower your risk for many health problems. Take care of your mental health. Try to stay connected with friends, family, and community, and find ways to manage stress. If you're feeling depressed or hopeless, talk to someone. A counselor can help. If you don't have a counselor, talk to your doctor. Talk to your doctor if you think you may have a problem with alcohol or drug use. This includes prescription medicines and illegal drugs. Avoid tobacco and nicotine: Don't smoke, vape, or chew. If you need help quitting, talk to your doctor. Practice safer sex. Getting tested, using condoms or dental dams, and limiting sex partners can help prevent STIs. Use birth control if it's important to you to prevent pregnancy. Talk with your doctor about your choices and what might be best for you. Prevent problems where you can.  Protect your skin from too much sun, wash your hands, brush your teeth twice a day, and wear a seat belt in the car. Where can you learn more? Go to http://www.chambers.com/ and enter P072 to learn more about \"Well Visit, Ages 25 to 72: Care Instructions. \"  Current as of: March 9, 2022               Content Version: 13.5  © 2896-2901 [employer]. Care instructions adapted under license by [facility] ([facility]). If you have questions about a medical condition or this instruction, always ask your healthcare professional. [address] any warranty or liability for your use of this information.

## 2023-01-30 NOTE — PROGRESS NOTES
Well Adult Note  Name: Jd Scanlon Date: 2023   MRN: [id number] Sex: Male   Age: 42 y.o. Ethnicity: Non- / Non    : 1985 Race: White (non-)      Norma Ayala is here for well adult exam.  History:    Well Adult Physical   Patient here for a comprehensive physical exam.The patient reports problems - none   Do you take any herbs or supplements that were not prescribed by a doctor? no Are you taking calcium supplements? not applicable Are you taking aspirin daily? not applicable   History:  Any STD's in the past? none        Review of Systems   Constitutional:  Negative for activity change, appetite change, fatigue, fever and unexpected weight change. HENT:  Negative for congestion, postnasal drip, rhinorrhea and trouble swallowing. Eyes:  Negative for redness and itching. Respiratory:  Negative for chest tightness, shortness of breath and wheezing. Cardiovascular:  Negative for chest pain and palpitations. Gastrointestinal:  Negative for abdominal pain, nausea and vomiting. Genitourinary:  Negative for dysuria and urgency. Musculoskeletal:  Negative for arthralgias, joint swelling, myalgias and neck pain. Skin:  Negative for rash. Neurological:  Negative for light-headedness and headaches. Hematological:  Negative for adenopathy. Psychiatric/Behavioral:  Negative for behavioral problems and dysphoric mood. The patient is not nervous/anxious.       No Known Allergies      Prior to Visit Medications    Not on File         Past Medical History:   Diagnosis Date    Anxiety     Anxiety disorder 2015    Chicken pox     Irritable bowel syndrome with diarrhea 2015       Past Surgical History:   Procedure Laterality Date    WISDOM TOOTH EXTRACTION           Family History   Problem Relation Age of Onset    Heart Attack Maternal Grandfather        Social History     Tobacco Use    Smoking status: Some Days     Types: Cigarettes     Last attempt to quit: 2015     Years since quittin.5    Smokeless tobacco: Never    Tobacco comments:     smokes when he ddrinks   Vaping Use    Vaping Use: Every day    Start date: 10/26/2018    Substances: Always   Substance Use Topics    Alcohol use: Yes     Alcohol/week: 0.0 standard drinks     Comment: social (once a month)    Drug use: No       Objective   /72   Pulse 88   Temp 97.7 °F (36.5 °C) (Tympanic)   Resp 16   Ht 5' 10\" (1.778 m)   Wt 186 lb 3.2 oz (84.5 kg)   SpO2 98%   BMI 26.72 kg/m²   Wt Readings from Last 3 Encounters:   23 186 lb 3.2 oz (84.5 kg)   22 188 lb 3.2 oz (85.4 kg)   21 190 lb (86.2 kg)     There were no vitals filed for this visit. Physical Exam  Vitals and nursing note reviewed. Constitutional:       General: He is not in acute distress. Appearance: Normal appearance. He is well-developed and normal weight. He is not ill-appearing, toxic-appearing or diaphoretic. HENT:      Head: Normocephalic. Right Ear: Tympanic membrane, ear canal and external ear normal. There is no impacted cerumen. Left Ear: Tympanic membrane, ear canal and external ear normal. There is no impacted cerumen. Nose: Nose normal. No congestion. Mouth/Throat:      Mouth: Mucous membranes are moist.      Pharynx: Oropharynx is clear. No oropharyngeal exudate or posterior oropharyngeal erythema. Eyes:      General: No scleral icterus. Right eye: No discharge. Left eye: No discharge. Extraocular Movements: Extraocular movements intact. Conjunctiva/sclera: Conjunctivae normal.      Pupils: Pupils are equal, round, and reactive to light. Neck:      Thyroid: No thyromegaly. Vascular: No carotid bruit or JVD. Cardiovascular:      Rate and Rhythm: Normal rate and regular rhythm. Pulses: Normal pulses. Heart sounds: Normal heart sounds. No murmur heard. No friction rub. No gallop.    Pulmonary:      Effort: Pulmonary effort is normal. No respiratory distress. Breath sounds: Normal breath sounds. No stridor. No wheezing, rhonchi or rales. Chest:      Chest wall: No tenderness. Abdominal:      General: Abdomen is flat. Bowel sounds are normal. There is no distension. Palpations: Abdomen is soft. There is no mass. Tenderness: There is no abdominal tenderness. There is no guarding. Hernia: No hernia is present. Musculoskeletal:         General: No swelling or tenderness. Normal range of motion. Cervical back: Normal range of motion and neck supple. No muscular tenderness. Right lower leg: No edema. Left lower leg: No edema. Lymphadenopathy:      Cervical: No cervical adenopathy. Skin:     General: Skin is warm. Capillary Refill: Capillary refill takes less than 2 seconds. Coloration: Skin is not pale. Findings: No erythema or rash. Neurological:      General: No focal deficit present. Mental Status: He is alert and oriented to person, place, and time. Mental status is at baseline. Cranial Nerves: No cranial nerve deficit. Sensory: No sensory deficit. Motor: No weakness or abnormal muscle tone. Coordination: Coordination normal.      Gait: Gait normal.      Deep Tendon Reflexes: Reflexes normal.   Psychiatric:         Mood and Affect: Mood normal.         Behavior: Behavior normal.         Thought Content: Thought content normal.         Judgment: Judgment normal.         Assessment   Plan      Diagnosis Orders   1 Encounter for well adult exam without abnormal findings      . Doing well, encourage healthy diet, exercise, safety    . Screening labs orders given   . Preventive: recommended Covid and Flu vaccine   .                     Lipid Panel    Pneumococcal, PCV20, PREVNAR 20, (age 25 yrs+), IM, PF      2 Personal history of tobacco use, presenting hazards to health   Counseling,    [place] TOBACCO USE CESSATION INTERMEDIATE 3-10 MINUTES [18877] Personalized Preventive Plan   Current Health Maintenance Status  Immunization History   Administered Date(s) Administered    Influenza, FLUARIX, FLULAVAL, FLUZONE (age 10 mo+) AND AFLURIA, (age 1 y+), PF, 0.5mL 10/18/2019    Tdap (Boostrix, Adacel) 12/04/2017        Health Maintenance   Topic Date Due    COVID-19 Vaccine (1) Never done    Varicella vaccine (1 of 2 - 2-dose childhood series) Never done    Pneumococcal 0-64 years Vaccine (1 - PCV) Never done    Diabetes screen  Never done    Flu vaccine (1) 08/01/2022    Depression Screen  01/13/2023    DTaP/Tdap/Td vaccine (2 - Td or Tdap) 12/04/2027    Hepatitis C screen  Completed    HIV screen  Completed    Hepatitis A vaccine  Aged Out    Hib vaccine  Aged Out    Meningococcal (ACWY) vaccine  Aged Out     Recommendations for [employer] Due: see orders and patient instructions/AVS.    Fu prn

## 2023-01-31 LAB
AMBIGUOUS ABBREVIATION: NORMAL
AMBIGUOUS ABBREVIATION: NORMAL
BUN / CREAT RATIO: 13 (ref 9–20)
BUN BLDV-MCNC: 14 MG/DL (ref 6–20)
CALCIUM SERPL-MCNC: 9.8 MG/DL (ref 8.7–10.2)
CHLORIDE BLD-SCNC: 104 MMOL/L (ref 96–106)
CHOLESTEROL, TOTAL: 193 MG/DL (ref 100–199)
CO2: 20 MMOL/L (ref 20–29)
COMMENT: ABNORMAL
CREAT SERPL-MCNC: 1.09 MG/DL (ref 0.76–1.27)
ESTIMATED GLOMERULAR FILTRATION RATE CREATININE EQUATION: 89 ML/MIN/1.73
GLUCOSE BLD-MCNC: 91 MG/DL (ref 70–99)
HDLC SERPL-MCNC: 42 MG/DL
LDL CHOLESTEROL CALCULATED: 123 MG/DL (ref 0–99)
POTASSIUM SERPL-SCNC: 4.8 MMOL/L (ref 3.5–5.2)
SODIUM BLD-SCNC: 139 MMOL/L (ref 134–144)
TRIGL SERPL-MCNC: 156 MG/DL (ref 0–149)
VLDLC SERPL CALC-MCNC: 28 MG/DL (ref 5–40)

## 2024-01-31 ENCOUNTER — OFFICE VISIT (OUTPATIENT)
Dept: FAMILY MEDICINE CLINIC | Age: 39
End: 2024-01-31
Payer: COMMERCIAL

## 2024-01-31 VITALS
OXYGEN SATURATION: 98 % | DIASTOLIC BLOOD PRESSURE: 80 MMHG | SYSTOLIC BLOOD PRESSURE: 113 MMHG | TEMPERATURE: 97.3 F | HEIGHT: 70 IN | HEART RATE: 75 BPM | WEIGHT: 191.3 LBS | BODY MASS INDEX: 27.39 KG/M2

## 2024-01-31 DIAGNOSIS — Z00.00 ENCOUNTER FOR WELL ADULT EXAM WITHOUT ABNORMAL FINDINGS: Primary | ICD-10-CM

## 2024-01-31 DIAGNOSIS — L25.9 CONTACT DERMATITIS, UNSPECIFIED CONTACT DERMATITIS TYPE, UNSPECIFIED TRIGGER: ICD-10-CM

## 2024-01-31 DIAGNOSIS — Z00.00 WELL ADULT EXAM: ICD-10-CM

## 2024-01-31 PROCEDURE — 99213 OFFICE O/P EST LOW 20 MIN: CPT | Performed by: FAMILY MEDICINE

## 2024-01-31 PROCEDURE — 99396 PREV VISIT EST AGE 40-64: CPT | Performed by: FAMILY MEDICINE

## 2024-01-31 RX ORDER — FLUTICASONE PROPIONATE 0.05 MG/G
OINTMENT TOPICAL
Qty: 30 G | Refills: 1 | Status: SHIPPED | OUTPATIENT
Start: 2024-01-31

## 2024-01-31 SDOH — ECONOMIC STABILITY: INCOME INSECURITY: HOW HARD IS IT FOR YOU TO PAY FOR THE VERY BASICS LIKE FOOD, HOUSING, MEDICAL CARE, AND HEATING?: NOT VERY HARD

## 2024-01-31 SDOH — ECONOMIC STABILITY: FOOD INSECURITY: WITHIN THE PAST 12 MONTHS, THE FOOD YOU BOUGHT JUST DIDN'T LAST AND YOU DIDN'T HAVE MONEY TO GET MORE.: NEVER TRUE

## 2024-01-31 SDOH — ECONOMIC STABILITY: FOOD INSECURITY: WITHIN THE PAST 12 MONTHS, YOU WORRIED THAT YOUR FOOD WOULD RUN OUT BEFORE YOU GOT MONEY TO BUY MORE.: NEVER TRUE

## 2024-01-31 SDOH — ECONOMIC STABILITY: HOUSING INSECURITY
IN THE LAST 12 MONTHS, WAS THERE A TIME WHEN YOU DID NOT HAVE A STEADY PLACE TO SLEEP OR SLEPT IN A SHELTER (INCLUDING NOW)?: NO

## 2024-01-31 ASSESSMENT — PATIENT HEALTH QUESTIONNAIRE - PHQ9
SUM OF ALL RESPONSES TO PHQ QUESTIONS 1-9: 0
SUM OF ALL RESPONSES TO PHQ9 QUESTIONS 1 & 2: 0
SUM OF ALL RESPONSES TO PHQ QUESTIONS 1-9: 0
2. FEELING DOWN, DEPRESSED OR HOPELESS: 0
1. LITTLE INTEREST OR PLEASURE IN DOING THINGS: 0
SUM OF ALL RESPONSES TO PHQ QUESTIONS 1-9: 0
SUM OF ALL RESPONSES TO PHQ QUESTIONS 1-9: 0

## 2024-01-31 ASSESSMENT — ENCOUNTER SYMPTOMS
NAUSEA: 0
SHORTNESS OF BREATH: 0
RHINORRHEA: 0
EYE REDNESS: 0
EYE ITCHING: 0
WHEEZING: 0
VOMITING: 0
ABDOMINAL PAIN: 0
TROUBLE SWALLOWING: 0
CHEST TIGHTNESS: 0

## 2024-01-31 NOTE — PROGRESS NOTES
Heart Attack Maternal Grandfather        Social History     Tobacco Use    Smoking status: Some Days     Current packs/day: 0.00     Types: Cigarettes     Last attempt to quit: 2015     Years since quittin.5    Smokeless tobacco: Never    Tobacco comments:     smokes when he ddrinks   Vaping Use    Vaping Use: Every day    Start date: 10/26/2018    Substances: Always   Substance Use Topics    Alcohol use: Yes     Alcohol/week: 0.0 standard drinks of alcohol     Comment: social (once a month)    Drug use: No       Objective     Vital Signs  /80 (Site: Right Upper Arm, Position: Sitting, Cuff Size: Medium Adult)   Pulse 75   Temp 97.3 °F (36.3 °C) (Temporal)   Ht 1.778 m (5' 10\")   Wt 86.8 kg (191 lb 4.8 oz)   SpO2 98%   BMI 27.45 kg/m²   Wt Readings from Last 3 Encounters:   24 86.8 kg (191 lb 4.8 oz)   23 84.5 kg (186 lb 3.2 oz)   22 85.4 kg (188 lb 3.2 oz)       Waist Circumference  There were no vitals filed for this visit.    Physical Exam  Vitals and nursing note reviewed.   Constitutional:       General: He is not in acute distress.     Appearance: Normal appearance. He is well-developed. He is obese. He is not ill-appearing, toxic-appearing or diaphoretic.   HENT:      Head: Normocephalic.      Right Ear: Tympanic membrane, ear canal and external ear normal. There is no impacted cerumen.      Left Ear: Tympanic membrane, ear canal and external ear normal. There is no impacted cerumen.   Eyes:      General: No scleral icterus.        Right eye: No discharge.         Left eye: No discharge.      Extraocular Movements: Extraocular movements intact.      Conjunctiva/sclera: Conjunctivae normal.      Pupils: Pupils are equal, round, and reactive to light.   Neck:      Thyroid: No thyromegaly.      Vascular: No carotid bruit or JVD.   Cardiovascular:      Rate and Rhythm: Normal rate and regular rhythm.      Pulses: Normal pulses.      Heart sounds: Normal heart sounds. No

## 2024-02-13 ENCOUNTER — TELEPHONE (OUTPATIENT)
Dept: FAMILY MEDICINE CLINIC | Age: 39
End: 2024-02-13